# Patient Record
Sex: FEMALE | Race: WHITE | URBAN - METROPOLITAN AREA
[De-identification: names, ages, dates, MRNs, and addresses within clinical notes are randomized per-mention and may not be internally consistent; named-entity substitution may affect disease eponyms.]

---

## 2022-09-12 ENCOUNTER — TELEPHONE ENCOUNTER (OUTPATIENT)
Dept: URBAN - METROPOLITAN AREA CLINIC 72 | Facility: CLINIC | Age: 87
End: 2022-09-12

## 2022-09-13 ENCOUNTER — OFFICE VISIT (OUTPATIENT)
Dept: URBAN - METROPOLITAN AREA CLINIC 113 | Facility: CLINIC | Age: 87
End: 2022-09-13

## 2022-11-30 ENCOUNTER — OFFICE VISIT (OUTPATIENT)
Dept: URBAN - METROPOLITAN AREA CLINIC 113 | Facility: CLINIC | Age: 87
End: 2022-11-30
Payer: MEDICARE

## 2022-11-30 VITALS
RESPIRATION RATE: 20 BRPM | SYSTOLIC BLOOD PRESSURE: 132 MMHG | WEIGHT: 98.2 LBS | HEART RATE: 71 BPM | TEMPERATURE: 97.2 F | DIASTOLIC BLOOD PRESSURE: 70 MMHG | BODY MASS INDEX: 20.61 KG/M2 | HEIGHT: 58 IN

## 2022-11-30 DIAGNOSIS — R19.7 DIARRHEA, UNSPECIFIED TYPE: ICD-10-CM

## 2022-11-30 DIAGNOSIS — R63.4 WEIGHT LOSS: ICD-10-CM

## 2022-11-30 PROCEDURE — 99205 OFFICE O/P NEW HI 60 MIN: CPT | Performed by: INTERNAL MEDICINE

## 2022-11-30 RX ORDER — PANTOPRAZOLE SODIUM 40 MG/1
1 TABLET TABLET, DELAYED RELEASE ORAL ONCE A DAY
Status: ACTIVE | COMMUNITY

## 2022-11-30 RX ORDER — LORAZEPAM 0.5 MG/1
1 TABLET AT BEDTIME AS NEEDED TABLET ORAL ONCE A DAY
Status: ACTIVE | COMMUNITY

## 2022-11-30 RX ORDER — ESCITALOPRAM OXALATE 20 MG/1
1 TABLET TABLET ORAL ONCE A DAY
Status: ACTIVE | COMMUNITY

## 2022-11-30 RX ORDER — DICYCLOMINE HYDROCHLORIDE 10 MG/1
1 TABLET CAPSULE ORAL THREE TIMES A DAY
Status: ACTIVE | COMMUNITY

## 2022-11-30 RX ORDER — OMEGA-3S/DHA/EPA/FISH OIL 1000-1400
AS DIRECTED CAPSULE,DELAYED RELEASE (ENTERIC COATED) ORAL
Status: ACTIVE | COMMUNITY

## 2022-11-30 RX ORDER — ATORVASTATIN CALCIUM 20 MG/1
1 TABLET TABLET, FILM COATED ORAL ONCE A DAY
Status: ACTIVE | COMMUNITY

## 2022-11-30 RX ORDER — LEVOTHYROXINE SODIUM 88 UG/1
1 TABLET IN THE MORNING ON AN EMPTY STOMACH TABLET ORAL ONCE A DAY
Status: ACTIVE | COMMUNITY
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
## 2022-12-03 ENCOUNTER — LAB OUTSIDE AN ENCOUNTER (OUTPATIENT)
Dept: URBAN - METROPOLITAN AREA CLINIC 113 | Facility: CLINIC | Age: 87
End: 2022-12-03

## 2022-12-06 ENCOUNTER — TELEPHONE ENCOUNTER (OUTPATIENT)
Dept: URBAN - METROPOLITAN AREA CLINIC 113 | Facility: CLINIC | Age: 87
End: 2022-12-06

## 2022-12-20 ENCOUNTER — TELEPHONE ENCOUNTER (OUTPATIENT)
Dept: URBAN - METROPOLITAN AREA CLINIC 113 | Facility: CLINIC | Age: 87
End: 2022-12-20

## 2023-02-15 ENCOUNTER — WEB ENCOUNTER (OUTPATIENT)
Dept: URBAN - METROPOLITAN AREA SURGERY CENTER 25 | Facility: SURGERY CENTER | Age: 88
End: 2023-02-15

## 2023-02-20 ENCOUNTER — CLAIMS CREATED FROM THE CLAIM WINDOW (OUTPATIENT)
Dept: URBAN - METROPOLITAN AREA CLINIC 4 | Facility: CLINIC | Age: 88
End: 2023-02-20
Payer: MEDICARE

## 2023-02-20 ENCOUNTER — OFFICE VISIT (OUTPATIENT)
Dept: URBAN - METROPOLITAN AREA SURGERY CENTER 25 | Facility: SURGERY CENTER | Age: 88
End: 2023-02-20
Payer: MEDICARE

## 2023-02-20 DIAGNOSIS — K63.89 OTHER SPECIFIED DISEASES OF INTESTINE: ICD-10-CM

## 2023-02-20 DIAGNOSIS — Z86.010 PERSONAL HISTORY OF COLONIC POLYPS: ICD-10-CM

## 2023-02-20 DIAGNOSIS — R19.7 CHRONIC DIARRHEA: ICD-10-CM

## 2023-02-20 DIAGNOSIS — D12.3 ADENOMA OF TRANSVERSE COLON: ICD-10-CM

## 2023-02-20 DIAGNOSIS — Z12.11 ENCOUNTER FOR SCREENING FOR MALIGNANT NEOPLASM OF COLON: ICD-10-CM

## 2023-02-20 DIAGNOSIS — D12.8 TUBULAR ADENOMA OF RECTUM: ICD-10-CM

## 2023-02-20 PROCEDURE — G8907 PT DOC NO EVENTS ON DISCHARG: HCPCS | Performed by: INTERNAL MEDICINE

## 2023-02-20 PROCEDURE — 45380 COLONOSCOPY AND BIOPSY: CPT | Performed by: INTERNAL MEDICINE

## 2023-02-20 PROCEDURE — 45385 COLONOSCOPY W/LESION REMOVAL: CPT | Performed by: INTERNAL MEDICINE

## 2023-02-20 PROCEDURE — 88305 TISSUE EXAM BY PATHOLOGIST: CPT | Performed by: PATHOLOGY

## 2023-02-20 PROCEDURE — 88302 TISSUE EXAM BY PATHOLOGIST: CPT | Performed by: PATHOLOGY

## 2023-02-20 RX ORDER — ESCITALOPRAM OXALATE 20 MG/1
1 TABLET TABLET ORAL ONCE A DAY
Status: ACTIVE | COMMUNITY

## 2023-02-20 RX ORDER — ATORVASTATIN CALCIUM 20 MG/1
1 TABLET TABLET, FILM COATED ORAL ONCE A DAY
Status: ACTIVE | COMMUNITY

## 2023-02-20 RX ORDER — PANTOPRAZOLE SODIUM 40 MG/1
1 TABLET TABLET, DELAYED RELEASE ORAL ONCE A DAY
Status: ACTIVE | COMMUNITY

## 2023-02-20 RX ORDER — OMEGA-3S/DHA/EPA/FISH OIL 1000-1400
AS DIRECTED CAPSULE,DELAYED RELEASE (ENTERIC COATED) ORAL
Status: ACTIVE | COMMUNITY

## 2023-02-20 RX ORDER — LORAZEPAM 0.5 MG/1
1 TABLET AT BEDTIME AS NEEDED TABLET ORAL ONCE A DAY
Status: ACTIVE | COMMUNITY

## 2023-02-20 RX ORDER — DICYCLOMINE HYDROCHLORIDE 10 MG/1
1 TABLET CAPSULE ORAL THREE TIMES A DAY
Status: ACTIVE | COMMUNITY

## 2023-02-20 RX ORDER — LEVOTHYROXINE SODIUM 88 UG/1
1 TABLET IN THE MORNING ON AN EMPTY STOMACH TABLET ORAL ONCE A DAY
Status: ACTIVE | COMMUNITY

## 2023-03-21 ENCOUNTER — OFFICE VISIT (OUTPATIENT)
Dept: URBAN - METROPOLITAN AREA CLINIC 107 | Facility: CLINIC | Age: 88
End: 2023-03-21
Payer: MEDICARE

## 2023-03-21 VITALS
HEIGHT: 58 IN | HEART RATE: 69 BPM | DIASTOLIC BLOOD PRESSURE: 75 MMHG | BODY MASS INDEX: 18.89 KG/M2 | SYSTOLIC BLOOD PRESSURE: 119 MMHG | TEMPERATURE: 97.8 F | WEIGHT: 90 LBS

## 2023-03-21 DIAGNOSIS — K57.30 COLON, DIVERTICULOSIS: ICD-10-CM

## 2023-03-21 DIAGNOSIS — R19.7 INTERMITTENT DIARRHEA: ICD-10-CM

## 2023-03-21 DIAGNOSIS — Z86.010 HISTORY OF ADENOMATOUS POLYP OF COLON: ICD-10-CM

## 2023-03-21 PROBLEM — 429047008: Status: ACTIVE | Noted: 2023-03-21

## 2023-03-21 PROBLEM — 733657002: Status: ACTIVE | Noted: 2023-03-21

## 2023-03-21 PROCEDURE — 99214 OFFICE O/P EST MOD 30 MIN: CPT | Performed by: NURSE PRACTITIONER

## 2023-03-21 RX ORDER — LEVOTHYROXINE SODIUM 88 UG/1
1 TABLET IN THE MORNING ON AN EMPTY STOMACH TABLET ORAL ONCE A DAY
Status: ACTIVE | COMMUNITY

## 2023-03-21 RX ORDER — OMEGA-3S/DHA/EPA/FISH OIL 1000-1400
AS DIRECTED CAPSULE,DELAYED RELEASE (ENTERIC COATED) ORAL
Status: ACTIVE | COMMUNITY

## 2023-03-21 RX ORDER — DICYCLOMINE HYDROCHLORIDE 10 MG/1
1 TABLET CAPSULE ORAL THREE TIMES A DAY
Status: ACTIVE | COMMUNITY

## 2023-03-21 RX ORDER — ATORVASTATIN CALCIUM 20 MG/1
1 TABLET TABLET, FILM COATED ORAL ONCE A DAY
Status: ACTIVE | COMMUNITY

## 2023-03-21 RX ORDER — PANTOPRAZOLE SODIUM 40 MG/1
1 TABLET TABLET, DELAYED RELEASE ORAL ONCE A DAY
Status: ON HOLD | COMMUNITY

## 2023-03-21 RX ORDER — LORAZEPAM 0.5 MG/1
1 TABLET AT BEDTIME AS NEEDED TABLET ORAL ONCE A DAY
Status: ACTIVE | COMMUNITY

## 2023-03-21 RX ORDER — ESCITALOPRAM OXALATE 20 MG/1
1 TABLET TABLET ORAL ONCE A DAY
Status: ACTIVE | COMMUNITY

## 2023-03-21 NOTE — HPI-TODAY'S VISIT:
This is an 87-year-old female with a history of anxiety/depression, hypothyroidism, anemia, gastroparesis presenting for follow-up.  She was initially seen 11/30/2022.  She had been hospitalized at [facility] from September 9 through September 10, 2022 with nausea, vomiting, diarrhea, and weight loss.  She had a baseline of diarrhea which sounded like irritable bowel syndrome.  He was also discussed there may be some baseline contribution from bile acid overflow diarrhea given prior cholecystectomy.  Recent episodes had resulted in diarrhea to the point that she had become dehydrated and required hospitalization.  Ulcerative colitis was  not suspected given lack of bloody stools although it was mentioned that Crohn's disease cannot completely be excluded.  It was not clear to Dr. Kilgore whether random biopsies were obtained at the time of her hospital stay recently.  She was avoiding gluten and lactose.  Labs were requested from [place] including biopsy results.  Labs were ordered to assess for celiac disease, hypothyroidism, and infectious process (stool studies).  Colonoscopy was also scheduled to evaluate for microscopic colitis.  She reports improvement in diarrhea.  However, her symptoms have not resolved.  She typically has 2 bowel movements daily.  She continues to report intermittent diarrhea during which she will have 3 loose or watery bowel movements during the day and 3 at night.  Occasionally, her stools are urgent after a meal or may begin at 11 PM  4 at 1 AM.  Usually, by 3 AM, diarrhea will resolve.  Diarrhea symptoms are worse when she eats at a restaurant or her  brings home takeout.  She denies red blood per rectum or melena.  She reports 1 episode of nausea recently.  This was isolated.  She is no longer taking pantoprazole.  This was discontinued by her primary care physician.  She is taking Pepcid and reports it works well.  She typically requires it once a day.  She denies any other abdominal symptoms.  She is avoiding dairy products.  She is not on a fiber supplement.

## 2023-05-02 ENCOUNTER — OFFICE VISIT (OUTPATIENT)
Dept: URBAN - METROPOLITAN AREA CLINIC 107 | Facility: CLINIC | Age: 88
End: 2023-05-02
Payer: MEDICARE

## 2023-05-02 VITALS
TEMPERATURE: 96.6 F | WEIGHT: 92 LBS | RESPIRATION RATE: 18 BRPM | BODY MASS INDEX: 19.31 KG/M2 | DIASTOLIC BLOOD PRESSURE: 63 MMHG | HEIGHT: 58 IN | SYSTOLIC BLOOD PRESSURE: 119 MMHG | HEART RATE: 64 BPM

## 2023-05-02 DIAGNOSIS — Z86.010 HISTORY OF ADENOMATOUS POLYP OF COLON: ICD-10-CM

## 2023-05-02 DIAGNOSIS — R19.7 INTERMITTENT DIARRHEA: ICD-10-CM

## 2023-05-02 DIAGNOSIS — K57.30 COLON, DIVERTICULOSIS: ICD-10-CM

## 2023-05-02 PROCEDURE — 99213 OFFICE O/P EST LOW 20 MIN: CPT | Performed by: INTERNAL MEDICINE

## 2023-05-02 RX ORDER — ESCITALOPRAM OXALATE 20 MG/1
1 TABLET TABLET ORAL ONCE A DAY
Status: ACTIVE | COMMUNITY

## 2023-05-02 RX ORDER — PANTOPRAZOLE SODIUM 40 MG/1
1 TABLET TABLET, DELAYED RELEASE ORAL ONCE A DAY
Status: ON HOLD | COMMUNITY

## 2023-05-02 RX ORDER — ATORVASTATIN CALCIUM 20 MG/1
1 TABLET TABLET, FILM COATED ORAL ONCE A DAY
Status: ACTIVE | COMMUNITY

## 2023-05-02 RX ORDER — OMEGA-3S/DHA/EPA/FISH OIL 1000-1400
AS DIRECTED CAPSULE,DELAYED RELEASE (ENTERIC COATED) ORAL
Status: ACTIVE | COMMUNITY

## 2023-05-02 RX ORDER — DICYCLOMINE HYDROCHLORIDE 10 MG/1
1 TABLET CAPSULE ORAL THREE TIMES A DAY
Status: ACTIVE | COMMUNITY

## 2023-05-02 RX ORDER — LEVOTHYROXINE SODIUM 88 UG/1
1 TABLET IN THE MORNING ON AN EMPTY STOMACH TABLET ORAL ONCE A DAY
Status: ACTIVE | COMMUNITY

## 2023-05-02 RX ORDER — LORAZEPAM 0.5 MG/1
1 TABLET AT BEDTIME AS NEEDED TABLET ORAL ONCE A DAY
Status: ACTIVE | COMMUNITY

## 2023-05-02 NOTE — HPI-TODAY'S VISIT:
This is an 87-year-old female with a history of anxiety/depression, hypothyroidism, anemia, gastroparesis presenting for follow-up.  She hwas last seen by Renee Guerrero on 3/21/23.  She was initially seen 11/30/2022.  She had been hospitalized at [facility] from September 9 through September 10, 2022 with nausea, vomiting, diarrhea, and weight loss.  She had a baseline of diarrhea which sounded like irritable bowel syndrome.  He was also discussed there may be some baseline contribution from bile acid overflow diarrhea given prior cholecystectomy.  Recent episodes had resulted in diarrhea to the point that she had become dehydrated and required hospitalization.  Ulcerative colitis was  not suspected given lack of bloody stools although it was mentioned that Crohn's disease cannot completely be excluded.  It was not clear to Dr. Kilgore whether random biopsies were obtained at the time of her hospital stay recently.  She was avoiding gluten and lactose.  Labs were requested from [place] including biopsy results.  Labs were ordered to assess for celiac disease, hypothyroidism, and infectious process (stool studies).  Colonoscopy was also scheduled to evaluate for microscopic colitis.  At her last visit on 3/21/23, she reported improvement in diarrhea.  However, her symptoms had not resolved.  She described having typically 2 loose to semi-formed bowel movements daily, along with intermittent diarrhea during which she will have 3 loose or watery bowel movements during the day and 3 at night.  She was avoiding dairy products and was not on a fiber supplement.  After that last visit, she was placed on Benefiber daily plus prophylactic Imodium.  She is much better overall, with firm stools and rare accidents.  She has had no black stools and occasional spotting of bright red blood in the toilet tissue with wiping.  She is "much better."

## 2023-05-02 NOTE — HPI-OTHER HISTORIES
Colonoscopy 2/20/2023:BBPS 9 (MiraLAX), pancolonic diverticulosis, normal terminal ileum, removal of 3 (rectal, descending, transverse) 6 to 9 mm sessile polyps, terminal ileum appeared normal, moderate grade 1 nonbleeding internal hemorrhoids, the colon appeared normal status post biopsy.  Pathology: Random biopsies demonstrated no significant abnormality.  Transverse and rectal polyps were tubular adenomas and ascending polyp was fecal material only. Labs 12/9/2022:Stool Giardia, Cryptosporidium, calprotectin, O&P normal or negative.  12/7/2022: Celiac panel negative.  TSH 1.440.  ESR 2.  CRP less than 1.

## 2023-05-04 ENCOUNTER — TELEPHONE ENCOUNTER (OUTPATIENT)
Dept: URBAN - METROPOLITAN AREA CLINIC 107 | Facility: CLINIC | Age: 88
End: 2023-05-04

## 2023-05-04 RX ORDER — COLESEVELAM HYDROCHLORIDE 625 MG/1
1 TABLETS WITH MEALS TABLET, COATED ORAL TWICE A DAY
Qty: 60 | Refills: 3 | OUTPATIENT
Start: 2023-05-04

## 2023-07-29 ENCOUNTER — WEB ENCOUNTER (OUTPATIENT)
Dept: URBAN - METROPOLITAN AREA CLINIC 107 | Facility: CLINIC | Age: 88
End: 2023-07-29

## 2023-08-01 ENCOUNTER — OFFICE VISIT (OUTPATIENT)
Dept: URBAN - METROPOLITAN AREA CLINIC 107 | Facility: CLINIC | Age: 88
End: 2023-08-01
Payer: MEDICARE

## 2023-08-01 ENCOUNTER — DASHBOARD ENCOUNTERS (OUTPATIENT)
Age: 88
End: 2023-08-01

## 2023-08-01 VITALS
HEART RATE: 60 BPM | DIASTOLIC BLOOD PRESSURE: 65 MMHG | BODY MASS INDEX: 19.31 KG/M2 | SYSTOLIC BLOOD PRESSURE: 103 MMHG | TEMPERATURE: 97.1 F | WEIGHT: 92 LBS | RESPIRATION RATE: 18 BRPM | HEIGHT: 58 IN

## 2023-08-01 DIAGNOSIS — Z86.010 HISTORY OF ADENOMATOUS POLYP OF COLON: ICD-10-CM

## 2023-08-01 DIAGNOSIS — K57.30 COLON, DIVERTICULOSIS: ICD-10-CM

## 2023-08-01 DIAGNOSIS — R19.7 INTERMITTENT DIARRHEA: ICD-10-CM

## 2023-08-01 PROCEDURE — 99213 OFFICE O/P EST LOW 20 MIN: CPT | Performed by: INTERNAL MEDICINE

## 2023-08-01 RX ORDER — ESCITALOPRAM OXALATE 20 MG/1
1 TABLET TABLET ORAL ONCE A DAY
Status: ACTIVE | COMMUNITY

## 2023-08-01 RX ORDER — COLESEVELAM HYDROCHLORIDE 625 MG/1
1 TABLETS WITH MEALS TABLET, COATED ORAL TWICE A DAY
Qty: 60 | Refills: 3 | Status: ACTIVE | COMMUNITY
Start: 2023-05-04

## 2023-08-01 RX ORDER — PANTOPRAZOLE SODIUM 40 MG/1
1 TABLET TABLET, DELAYED RELEASE ORAL ONCE A DAY
Status: ON HOLD | COMMUNITY

## 2023-08-01 RX ORDER — ATORVASTATIN CALCIUM 20 MG/1
1 TABLET TABLET, FILM COATED ORAL ONCE A DAY
Status: ACTIVE | COMMUNITY

## 2023-08-01 RX ORDER — OMEGA-3S/DHA/EPA/FISH OIL 1000-1400
AS DIRECTED CAPSULE,DELAYED RELEASE (ENTERIC COATED) ORAL
Status: ACTIVE | COMMUNITY

## 2023-08-01 RX ORDER — LEVOTHYROXINE SODIUM 88 UG/1
1 TABLET IN THE MORNING ON AN EMPTY STOMACH TABLET ORAL ONCE A DAY
Status: ACTIVE | COMMUNITY

## 2023-08-01 RX ORDER — DICYCLOMINE HYDROCHLORIDE 10 MG/1
1 TABLET CAPSULE ORAL THREE TIMES A DAY
Status: ACTIVE | COMMUNITY

## 2023-08-01 RX ORDER — LORAZEPAM 0.5 MG/1
1 TABLET AT BEDTIME AS NEEDED TABLET ORAL ONCE A DAY
Status: ACTIVE | COMMUNITY

## 2023-08-01 NOTE — HPI-TODAY'S VISIT:
This is an 87-year-old female with a history of anxiety/depression, hypothyroidism, anemia, gastroparesis presenting for follow-up.  She was last seen on 5/2/23.  She was initially seen 11/30/2022.  She had been hospitalized at [facility] from September 9 through September 10, 2022 with nausea, vomiting, diarrhea, and weight loss.  She had a baseline of diarrhea which sounded like irritable bowel syndrome.  He was also discussed there may be some baseline contribution from bile acid overflow diarrhea given prior cholecystectomy.  Recent episodes had resulted in diarrhea to the point that she had become dehydrated and required hospitalization.  Ulcerative colitis was  not suspected given lack of bloody stools although it was mentioned that Crohn's disease cannot completely be excluded.  It was not clear to Dr. Kilgore whether random biopsies were obtained at the time of her hospital stay recently.  She was avoiding gluten and lactose.  Labs were requested from [place] including biopsy results.  Labs were ordered to assess for celiac disease, hypothyroidism, and infectious process (stool studies).  Colonoscopy was also scheduled to evaluate for microscopic colitis.  At her 3/21/23 visit, she reported improvement in diarrhea.  However, her symptoms had not resolved.  She described having typically 2 loose to semi-formed bowel movements daily, along with intermittent diarrhea during which she will have 3 loose or watery bowel movements during the day and 3 at night.  She was avoiding dairy products and was not on a fiber supplement.  After the 3/21/23 visit, she was placed on Benefiber daily plus prophylactic Imodium.  She was "much better" overall when she came back for follow up on 5/2/23, with firm stools and rare accidents.  She has had no black stools and occasional spotting of bright red blood in the toilet tissue with wiping.    The patient is doing well currently.  She only has rare diarrhea and is actually stopped the Benefiber.  She is using Imodium prophylactically, but at this point has no acute GI complaints.

## 2023-08-01 NOTE — PHYSICAL EXAM CARDIOVASCULAR:
no edema, no murmurs, regular rate and rhythm , no edema, no murmurs, regular rate and rhythm (2) assistive person

## 2023-12-11 ENCOUNTER — ERX REFILL RESPONSE (OUTPATIENT)
Dept: URBAN - METROPOLITAN AREA CLINIC 107 | Facility: CLINIC | Age: 88
End: 2023-12-11

## 2023-12-11 RX ORDER — COLESEVELAM HYDROCHLORIDE 625 MG/1
1 TABLETS WITH MEALS TABLET, COATED ORAL TWICE A DAY
Qty: 60 | Refills: 3 | OUTPATIENT

## 2023-12-11 RX ORDER — COLESEVELAM HYDROCHLORIDE 625 MG/1
TAKE ONE TABLET BY MOUTH TWICE A DAY WITH A MEAL TABLET, FILM COATED ORAL
Qty: 60 TABLET | Refills: 3 | OUTPATIENT

## 2024-08-28 ENCOUNTER — ERX REFILL RESPONSE (OUTPATIENT)
Dept: URBAN - METROPOLITAN AREA CLINIC 107 | Facility: CLINIC | Age: 89
End: 2024-08-28

## 2024-08-28 RX ORDER — COLESEVELAM HYDROCHLORIDE 625 MG/1
TAKE ONE TABLET BY MOUTH TWICE A DAY WITH A MEAL TABLET, FILM COATED ORAL
Qty: 60 TABLET | Refills: 3 | OUTPATIENT

## 2025-02-19 ENCOUNTER — ERX REFILL RESPONSE (OUTPATIENT)
Dept: URBAN - METROPOLITAN AREA CLINIC 107 | Facility: CLINIC | Age: 89
End: 2025-02-19

## 2025-02-19 RX ORDER — COLESEVELAM HYDROCHLORIDE 625 MG/1
TAKE ONE TABLET BY MOUTH TWICE A DAY WITH A MEAL TABLET, FILM COATED ORAL
Qty: 60 TABLET | Refills: 3 | OUTPATIENT